# Patient Record
Sex: MALE | Race: WHITE | NOT HISPANIC OR LATINO | URBAN - METROPOLITAN AREA
[De-identification: names, ages, dates, MRNs, and addresses within clinical notes are randomized per-mention and may not be internally consistent; named-entity substitution may affect disease eponyms.]

---

## 2019-01-25 ENCOUNTER — COMMUNICATION - HEALTHEAST (OUTPATIENT)
Dept: TELEHEALTH | Facility: CLINIC | Age: 54
End: 2019-01-25

## 2019-01-25 ENCOUNTER — OFFICE VISIT - HEALTHEAST (OUTPATIENT)
Dept: FAMILY MEDICINE | Facility: CLINIC | Age: 54
End: 2019-01-25

## 2019-01-25 ENCOUNTER — COMMUNICATION - HEALTHEAST (OUTPATIENT)
Dept: FAMILY MEDICINE | Facility: CLINIC | Age: 54
End: 2019-01-25

## 2019-01-25 DIAGNOSIS — Z76.89 ESTABLISHING CARE WITH NEW DOCTOR, ENCOUNTER FOR: ICD-10-CM

## 2019-01-25 DIAGNOSIS — R05.9 COUGH: ICD-10-CM

## 2019-01-25 DIAGNOSIS — Z12.11 SCREEN FOR COLON CANCER: ICD-10-CM

## 2019-01-25 DIAGNOSIS — J20.8 ACUTE VIRAL BRONCHITIS: ICD-10-CM

## 2019-01-25 ASSESSMENT — MIFFLIN-ST. JEOR: SCORE: 1860.04

## 2021-02-23 ENCOUNTER — OFFICE VISIT - HEALTHEAST (OUTPATIENT)
Dept: FAMILY MEDICINE | Facility: CLINIC | Age: 56
End: 2021-02-23

## 2021-02-23 ENCOUNTER — AMBULATORY - HEALTHEAST (OUTPATIENT)
Dept: FAMILY MEDICINE | Facility: CLINIC | Age: 56
End: 2021-02-23

## 2021-02-23 DIAGNOSIS — Z12.11 COLON CANCER SCREENING: ICD-10-CM

## 2021-02-23 DIAGNOSIS — M54.50 ACUTE RIGHT-SIDED LOW BACK PAIN WITHOUT SCIATICA: ICD-10-CM

## 2021-02-23 DIAGNOSIS — K64.9 HEMORRHOIDS, UNSPECIFIED HEMORRHOID TYPE: ICD-10-CM

## 2021-02-23 ASSESSMENT — MIFFLIN-ST. JEOR: SCORE: 1864.58

## 2021-03-09 ENCOUNTER — RECORDS - HEALTHEAST (OUTPATIENT)
Dept: ADMINISTRATIVE | Facility: OTHER | Age: 56
End: 2021-03-09

## 2021-05-27 ENCOUNTER — RECORDS - HEALTHEAST (OUTPATIENT)
Dept: ADMINISTRATIVE | Facility: CLINIC | Age: 56
End: 2021-05-27

## 2021-06-02 VITALS — HEIGHT: 71 IN | BODY MASS INDEX: 30.8 KG/M2 | WEIGHT: 220 LBS

## 2021-06-05 VITALS
WEIGHT: 221 LBS | SYSTOLIC BLOOD PRESSURE: 134 MMHG | BODY MASS INDEX: 30.94 KG/M2 | DIASTOLIC BLOOD PRESSURE: 78 MMHG | OXYGEN SATURATION: 98 % | HEART RATE: 67 BPM | HEIGHT: 71 IN

## 2021-06-15 NOTE — PROGRESS NOTES
" Patient ID: Kory Childers is a 55 y.o. male.  /78   Pulse 67   Ht 5' 11\" (1.803 m)   Wt 221 lb (100.2 kg)   SpO2 98%   BMI 30.82 kg/m      Assessment/Plan:                   Diagnoses and all orders for this visit:    Acute right-sided low back pain without sciatica  -     Ambulatory referral to PT/OT    Colon cancer screening  -     Ambulatory referral for Colonoscopy    Hemorrhoids, unspecified hemorrhoid type      DISCUSSION  For the back pain will refer to physical therapy.  No sign of internal pathology no sign of significant neurologic impingement no red flag symptoms.  If symptoms are not improving will consider additional options.  Discussed other aspects of self-care.    Given history of hemorrhoids recommend fiber supplementation.  This will also likely help with irritated skin around the anal opening.  Recommend following through with colonoscopy for screening purposes and to be absolutely certain that described bleeding was not the cause of something more significant.  Subjective:     HPI    Kory Childers is a 55 y.o. male who is generally an active healthy individual.  He is here today concerned regarding persistent right-sided low back pain.  He also brings up concerns that he thinks he may have been dealing with some hemorrhoids recently although the concern is improved.    He does not have any significant past medical problems.  He was last seen for a visit in January 2019.    He works for a [employer] as a supervisor.  He is , his wife is a registered nurse and a former employee at this clinic.  I see his father-in-law.  He has 2 children.  Has used chewing tobacco.  Does exercise regularly.    He reports he has developed right-sided low back pain.  He has no prior history of back surgery or significant back injuries.  Pain has been present now for several months does not seem to be completely resolving.  Pain is located on the right low side of the back.  There is no " "radiation of pain into the legs.  No weakness numbness tingling.  Denies any bowel or bladder dysfunction.  Is concerned about the possibility of internal pathology.    Reports he believes he may have had hemorrhoids.  He did not note any distinct lumps or bumps but had on several occasions intermittently noted some bleeding either on toilet tissue and on one occasion in the toilet bowl itself.  This has since resolved.  He has not had colon cancer screening.  Does report that changing bowel habits and incorporating more fiber in his diet seem to have helped.  Notes that eating more protein in his diet seem to be a triggering factor when this was occurring.    Review of Systems  Complete review of systems is obtained.  Other than the specific considerations noted above complete review of systems is negative.          Objective:   Medications:  No current outpatient medications on file.       Allergies:  No Known Allergies    Tobacco:   reports that he has quit smoking. His smokeless tobacco use includes chew.     Physical Exam          /78   Pulse 67   Ht 5' 11\" (1.803 m)   Wt 221 lb (100.2 kg)   SpO2 98%   BMI 30.82 kg/m      General: Patient no signs of distress    Heart: Regular rate and rhythm no murmur    Lungs: Good air movement no wheeze or crackle    Examination of his back reveals no significant amount of tenderness no midline tenderness.  No bony deformities.  Good range of motion.  Reports some discomfort in the right side low back with full flexion extension and side bend.  No CVA tenderness is noted.  No abdominal tenderness on exam.  He is able to heel and toe walk without difficulty.  He can squat and return to a standing position.  Negative straight leg raise test.  Reflexes are symmetric.  Sensation is intact.    Examination of the anal area reveals no obvious hemorrhoid or other abnormalities, there is mild findings of irritation suggestive of colitis seen.              "

## 2021-06-16 PROBLEM — R05.9 COUGH: Status: ACTIVE | Noted: 2019-01-25

## 2021-06-16 PROBLEM — Z72.0 TOBACCO CHEW USE: Status: ACTIVE | Noted: 2019-01-25

## 2021-06-16 PROBLEM — J20.8 ACUTE VIRAL BRONCHITIS: Status: ACTIVE | Noted: 2019-01-25

## 2021-06-23 NOTE — PROGRESS NOTES
Office Visit - New Patient   Kory Childers   53 y.o.  male    Date of visit: 2019  Physician: Nuria Jerez CNP     Assessment and Plan   1. Cough  XR Chest 2 Views    benzonatate (TESSALON) 200 MG capsule    predniSONE (DELTASONE) 20 MG tablet   2. Acute viral bronchitis  predniSONE (DELTASONE) 20 MG tablet   3. Screen for colon cancer  Cologuard    Cologuard   4. Establishing care with new doctor, encounter for           The following high BMI interventions were performed this visit: encouragement to exercise and weight monitoring    Return if symptoms worsen or fail to improve, for URI symptoms, annual physical.     Chief Complaint   Chief Complaint   Patient presents with     Cough     x 1 month - worse at [facility], green mucus        Patient Profile   Social History     Social History Narrative     Not on file        Past Medical History   Patient Active Problem List   Diagnosis     Tobacco chew use     Acute viral bronchitis     Cough       Past Surgical History  He has no past surgical history on file.     History of Present Illness   This 53 y.o. old who presents to establish care.  Medical, family and surgical history reviewed.  Father is , mother is living.  He does have 2 brothers and 1 sister.  He is , wife is a registered nurse and they have 2 children.  Patient works for the [employer] full-time as a [employer] .  He does chew 2 tins of tobacco a week.  Denies illicit drug use.  He is currently following a ketogenic diet, he is down 10 pounds in the last 8 days.  He does walk 4-6 miles per day. Due for annual physical and colon cancer screening. He has agreed to the Cologuard testing today.     His main concern for coming into the clinic today is that he has had a cough for the last month.  Onset of the cough started on .  Then his wife and him left for [place].  He got back from [place] 10 days ago and he is not feeling any better.  Wife also had upper respiratory  symptoms.  He does chew tobacco regularly.  He has not received any vaccinations or antibiotics recently.  He continues to walk 4-6 miles daily despite his persistent cough.  He states his cough is productive and is green in color.  He also has nasal congestion and a tickle in his throat and a rattle in his chest.  Laying flat and nighttime are the worse and will typically make his cough more severe.  He has tried NyQuil, DayQuil, cough drops, airborne and vitamin C for his symptoms.  He has not been to the doctor in well over 10 years other than his DOT physicals.  His last DOT physical was performed in December 2018.  He was certified for 2 years.  He denies pain anywhere today.  Denies fever, chills, nausea, vomiting or diarrhea or body aches today.  He is due to leave for [place] soon and just wanted to make sure he did not need an antibiotic.  His vital signs are stable today and he is afebrile.    Review of Systems: A comprehensive review of systems was negative except as noted.     Medications and Allergies   Current Outpatient Medications   Medication Sig Dispense Refill     benzonatate (TESSALON) 200 MG capsule Take 1 capsule (200 mg total) by mouth 3 (three) times a day as needed for cough. 30 capsule 0     predniSONE (DELTASONE) 20 MG tablet Take 40 mg by mouth daily for 5 days. 10 tablet 0     No current facility-administered medications for this visit.      No Known Allergies     Family and Social History   Family History   Problem Relation Age of Onset     No Medical Problems Mother      No Medical Problems Sister      No Medical Problems Brother      No Medical Problems Brother         Social History     Tobacco Use     Smoking status: Former Smoker     Smokeless tobacco: Current User     Types: Chew   Substance Use Topics     Alcohol use: No     Frequency: Never     Drug use: No        Physical Exam   General Appearance:   A & O x4, NAD, pleasant    /60   Pulse 61   Temp 97.7  F (36.5  C)   " Resp 14   Ht 5' 11\" (1.803 m)   Wt 220 lb (99.8 kg)   SpO2 96%   BMI 30.68 kg/m      EYES: Eyelids, conjunctiva, and sclera were normal. Pupils were normal. Cornea, iris, and lens were normal bilaterally.  HEAD, EARS, NOSE, MOUTH, AND THROAT: Head and face were normal. Hearing was normal to voice and the ears were normal to external exam. Nose appearance was normal and there was no discharge. Oropharynx was normal.  NECK: Neck appearance was normal. There were no neck masses and the thyroid was not enlarged. Faint wheezing noted in neck.   RESPIRATORY: Breathing pattern was normal and the chest moved symmetrically.   Lung sounds were normal and there were no abnormal sounds.  CARDIOVASCULAR: Heart rate and rhythm were normal.  S1 and S2 were normal and there were no extra sounds or murmurs. Peripheral pulses in arms and legs were normal.   GASTROINTESTINAL: The abdomen was normal in contour.  Bowel sounds were present.  Percussion detected no organ enlargement or tenderness.  Palpation detected no tenderness, mass, or enlarged organs.   LYMPHATIC: There were no enlarged nodes.  SKIN/HAIR/NAILS: Skin color was normal.  There were no skin lesions.  Hair and nails were normal.         Additional Information     Review and/or order of clinical lab tests:  Review and/or order of radiology tests:  Review and/or order of medicine tests:  Discussion of test results with performing physician:  Decision to obtain old records and/or obtain history from someone other than the patient:  Review and summarization of old records and/or obtaining history from someone other than the patient and.or discussion of case with another health care provider:  Independent visualization of image, tracing or specimen itself:    Time: total time spent with the patient was 30 minutes of which >50% was spent in counseling and coordination of care     Nuria Jerez, CNP  Family Nurse Practitioner  [facility] " [facility] [place]  [phone]

## 2021-06-23 NOTE — TELEPHONE ENCOUNTER
Please call and let him know that his chest xray is clear, no signs of any infection or fluid.   Nuria Jerez, NP

## 2021-06-23 NOTE — TELEPHONE ENCOUNTER
Left message to call back for: Kory  Information to relay to patient:  See message below.  Leslie JACQUES CMA